# Patient Record
(demographics unavailable — no encounter records)

---

## 2024-12-09 NOTE — ASSESSMENT
[FreeTextEntry1] : 82 yo female with h/o HTN, HLD, recent R hip THR 11/4/24 complicated by PE here to establish cardiac care.  #Chest pain - possible anginal equivalent, worsening --will obtain CCTA to evaluate for CAD given intermediate CV risk --will need beta blockers and steroids pre CT (contrast allergy - rash) --will start beta blocker --continue statin --continued CV risk factor modification discussed   # Hypertension -  well controlled - continue current anti-hypertensives inc ARB - home BP monitoring encouraged - continued exercise as tolerated - Low salt diet  #Pulmonary embolism - provoked in the setting of recent hip surgery --tolerating NOAC, continue for 3-6 months --activity as tolerated  # Hyperlipidemia -  goal LDL<100 - Continue current prescribed medications inc simvastatin - Low fat low cholesterol diet - Heart healthy diet emphasized - Exercise as tolerated  Follow up after above workup

## 2024-12-09 NOTE — ASSESSMENT
[FreeTextEntry1] : 84 yo female with h/o HTN, HLD, recent R hip THR 11/4/24 complicated by PE here to establish cardiac care.  #Chest pain - possible anginal equivalent, worsening --will obtain CCTA to evaluate for CAD given intermediate CV risk --will need beta blockers and steroids pre CT (contrast allergy - rash) --will start beta blocker --continue statin --continued CV risk factor modification discussed   # Hypertension -  well controlled - continue current anti-hypertensives inc ARB - home BP monitoring encouraged - continued exercise as tolerated - Low salt diet  #Pulmonary embolism - provoked in the setting of recent hip surgery --tolerating NOAC, continue for 3-6 months --activity as tolerated  # Hyperlipidemia -  goal LDL<100 - Continue current prescribed medications inc simvastatin - Low fat low cholesterol diet - Heart healthy diet emphasized - Exercise as tolerated  Follow up after above workup

## 2024-12-09 NOTE — HISTORY OF PRESENT ILLNESS
[FreeTextEntry1] : 82 yo female with h/o HTN, HLD, recent R hip THR 11/4/24 complicated by PE here to establish cardiac care. Post op noted to be short of breath and tachycardic; underwent CTA with evidence of PE bilaterally. She underwent mechanical thrombectomy with removal of large clot from the left PA. She reports epigastric "heart burn" like discomfort, different from what she had initially that led her back to the ER within 1 week. Continues to have it. Undregoing GI evaluation with Dr. Carbajal ([facility]) She underwent a repeat CTA that revealed resolution of clot. Troponins were mildly elevated. did not undergo further cardiac workup Denies any worsening with meals or with exertion. Although her exercise capacity has been very limited. She denies prior cardiac workup or history. Her father had an MI in his 40s.   Cardiac Workup EKG sinus tach at 107 bpm; non-specific ST-T changes TTE 11/2024: normal LV size/function. LVEF 58%, mildly enlarged RV, probably normal function, calcific AV, no AS. Mild MR/TR. 11/8/24: Mechanical thrombectomy using INARI of left PA

## 2025-01-09 NOTE — HISTORY OF PRESENT ILLNESS
[Former] : former [Never] : never [TextBox_4] : 83F with hx of HTN, HLD with recent hip fracture s/p THR 11/4/2024 ([facility]) c/b high intermediate risk PE who presents for follow up.   PCP: Dr. Wade Lisa [place], [place]  [phone]  To review:  Admitted 11/3 to [facility] with R sided hip fracture, underwent repair and 24 hrs post-op she developed tachycardia and borderline O2 sats. CTPA was done which showed bilateral PE, L>R and more proximal, s/p CDT with removal of large clot from left PA. TTE showed mildly enlarged RV and probably normal function, , pBNP 648. She was on heparin gtt to start given recent surgery, then transitioned to apixaban on discharge. One week later she had heartburn, went to the ER, repeat CTA was done which showed resolution of the clot.   She saw Dr. Schilling in follow up for cards. Started on beta blocker. Doing CCTA. Still on DOAC. Also TTE was ordered, scheduled for 1/16.  Labs were done later in Nov and CBC stable from inpt.   Today,  - no SOB  - does find difficulty going up stairs because of her legs - she finished rehab for the hip but still has knees that don't work as well and aches in her back  - walks with a cane - eliquis at 10mg BID was causing severe epigastric pain, went to ER they did a full work up found nothing  - once she went down to 5mg BID she improved with her epigastric pain but still it's been making her bloated and she's had blood in her gums which she's not sure is the gums itself or it's coming from the stomach, not coughing - no leg swelling, actually lost a lot of weight bc of the pain from the surgery and the water weight from all the fluids in the hospital   Social hx - former smoking history, quit many years ago

## 2025-01-09 NOTE — ASSESSMENT
[FreeTextEntry1] :   Of note, she has small pulmonary nodules on her CT, one in each lower lobe <6mm. Given round appearance, not in upper lobes, she's low risk even with former smoking history. No need for following.  0.44% probably that the lung nodule will be diagnosed in 2-4 year follow up per julio cesar scoring.

## 2025-01-09 NOTE — RESULTS/DATA
[TextEntry] : CTA 11/24 FINDINGS: CHEST: LUNGS AND LARGE AIRWAYS: Patent central airways. 3 mm right lower lobe nodule (3:91) and 4 mm left lower lobe pulmonary nodule (3:70), unchanged. PLEURA: No pleural effusion. VESSELS: No aortic aneurysm or dissection. The extensive left-sided pulmonary embolism noted on the exam of 11/5/2024 has almost completely resolved with only residual clot remaining. Previously seen right lung clot burden has resolved. HEART: Heart size is normal. No pericardial effusion. MEDIASTINUM AND TAYLOR: 2.4 x 1.9 cm anterior mediastinal nodule is indeterminant but unchanged in size since the previous exam. Neoplasm cannot be excluded. CHEST WALL AND LOWER NECK: Thyroid nodules and thyroid calcifications. The largest nodule measures 1.5 cm. Recommend nonemergent thyroid ultrasound.

## 2025-01-15 NOTE — HISTORY OF PRESENT ILLNESS
[FreeTextEntry1] : 82 y/o female w/ PMHx HTN, HLD, recent R hip THR 11/4/24 complicated by PE here to for follow up cardiac care. She is a patient of Dr. Schilling.   Recently saw pulmonology, Eliquis was decreased to 2.5mg BID (pt was experiencing continued reflux symptoms on full dose) Plan to continue Eliquis for at least 3 months post-PE Post op noted to be short of breath and tachycardic; underwent CTA with evidence of PE bilaterally. She underwent mechanical thrombectomy with removal of large clot from the left PA. She reports epigastric "heart burn" like discomfort, different from what she had initially that led her back to the ER within 1 week. Continues to have it. Undergoing GI evaluation with Dr. Carbajal ([facility]). Has had a colonoscopy within the last 3 years She underwent a repeat CTA that revealed resolution of clot. Troponins were mildly elevated. did not undergo further cardiac workup Denies any worsening with meals or with exertion. Although her exercise capacity has been very limited. She denies prior cardiac workup or history. Her father had an MI in his 40s.  Was recommended for CCTA, does not want to go for testing at this time Patient had repeat TTE today in office  Cardiac Workup EKG sinus tach at 107 bpm; non-specific ST-T changes TTE 11/2024: normal LV size/function. LVEF 58%, mildly enlarged RV, probably normal function, calcific AV, no AS. Mild MR/TR. 11/8/24: Mechanical thrombectomy using INARI of left PA

## 2025-01-15 NOTE — ASSESSMENT
[FreeTextEntry1] : 82 y/o female w/ PMHx HTN, HLD, recent R hip THR 11/4/24 complicated by PE here to for follow up cardiac care. She is a patient of Dr. Schilling.   Assessment and plan discussed w/ attending cardiologist Dr. Schilling.   #Chest pain - possible anginal equivalent, improved with PPI and decreased Eliquis dose --recommended CCTA to evaluate for CAD given intermediate CV risk, pt is resistant at this time --will need beta blockers and steroids pre CT (contrast allergy - rash) --c/w beta blocker, tolerating --continue statin --continued CV risk factor modification discussed  #Hypertension - well controlled - continue current anti-hypertensives inc ARB - home BP monitoring encouraged - continued exercise as tolerated - Low salt diet  #Pulmonary embolism 11/2024 - provoked in the setting of recent hip surgery --tolerating NOAC, continue for 3-6 months post-PE --activity as tolerated --will check labs including d-dimer today --f/u in 1 month with Dr. Schilling  # Hyperlipidemia - goal LDL<100 - Continue current prescribed medications inc simvastatin - Low fat low cholesterol diet - Heart healthy diet emphasized - Exercise as tolerated  Follow up with Dr. Schilling in 1 month, plan for repeat labs (d-dimer)

## 2025-01-31 NOTE — BEGINNING OF VISIT
[0] : 2) Feeling down, depressed, or hopeless: Not at all (0) [PHQ-2 Negative] : PHQ-2 Negative [Pain Scale: ___] : On a scale of 1-10, today the patient's pain is a(n) [unfilled]. [Former] : Former [> 15 Years] : > 15 Years [Date Discussed (MM/DD/YY): ___] : Discussed: [unfilled] [With Patient/Caregiver] : with Patient/Caregiver [Abdominal Pain] : no abdominal pain [Vomiting] : no vomiting [Constipation] : no constipation [Diarrhea Character] : Diarrhea: Grade 0

## 2025-01-31 NOTE — PHYSICAL EXAM
[Restricted in physically strenuous activity but ambulatory and able to carry out work of a light or sedentary nature] : Status 1- Restricted in physically strenuous activity but ambulatory and able to carry out work of a light or sedentary nature, e.g., light house work, office work [Normal] : affect appropriate [de-identified] : walks with cane outside the house

## 2025-01-31 NOTE — HISTORY OF PRESENT ILLNESS
[de-identified] : Mr. Michelle Boone is 83 years old female with hx of PE on Eliquis, mediastinal mass, normocytic anemia here for consultation accompanied by her  Shant Tavares   Patient with hx of HTN, HLD who was scheduled for right hip surgery on 11/4/2025 and developed PE (s/p thrombectomy) - sent home on Eliquis --> two weeks later went into [facility] for abdominal pain --> work up neg and sent home with lower dose of 2.5 mg bid, which resolved abdominal pain.    11/5/2024 CTA - tachycardia and hypoxia  Bilateral pulmonary emboli. Moderate clot burden with involvement of upper and lower lobar arteries on the left and lower segmental/subsegmental arteries on the right.  Flattening of the interventricular septum and dilated right ventricle withelevated RV: LV ratio, suggestive of right heart strain. Consider further evaluation with echocardiogram.  Anterior mediastinal soft tissue nodule, 2.2 cm, suspicious for neoplasm.Differential diagnosis includes thymic, pedrito, or metastatic origin. Consider further evaluation with MR or soft tissue sampling as clinically warranted.  Few scattered bilateral sub-0.4 cm pulmonary nodules. Findings may besecondary to infection, inflammatory, or neoplastic in etiology. Correlate with priors if available to assess for stability. s/p thrombectomy   TTE EF 58%, RV mildly enlarged  Doppler - No evidence of deep venous thrombosis in either lower extremity.  11/9/2025 MRI Of chest  1.  Questionably enhancing 2.2 cm anterior mediastinal structure remains indeterminate. Although differential does include other entities, favor thymic lesion such as a thymoma or thymic cyst. When clinically appropriate, advise CT of the chest with and without contrast for diagnostic clarity. 2.  Mildly increased small bilateral pleural effusions compared to 11/05/2024. 3.  Partially imaged bilateral hydronephrosis. Advise correlation with renaland bladder ultrasound.  11/24/2024 CT A/P/CTA No aortic aneurysm or dissection.  The extensive left-sided pulmonary embolism noted on the exam of 11/5/2024 has almost completely resolved with only residual clot remaining. Previously seen right lung clot burden has resolved. There is no new pulmonary embolism.  1/15/2025 WBC 7.49 H/H 11.3/37.8 MCV 98.4    FHx: daughter with breast cancer - around 42 - alive daughter with breast cancer in her 50 - alive brother with lymphoma in his 60s  SurgHx: Right hip surgery only   SocialHx: Smoked in high school/college years and 5 yrs in her 60s - quit > 15 yrs ago  Social alcohol use - 1 glass per day  Denies any illicit drugs Retired, worked at [employer]  Lives with spouse Shant  Screenings: colonoscopy - around 2022 mammogram - prior Covid  DEXA - prior to Covid

## 2025-01-31 NOTE — REVIEW OF SYSTEMS
[Joint Pain] : joint pain [Recent Change In Weight] : ~T recent weight change [Negative] : Allergic/Immunologic

## 2025-01-31 NOTE — HISTORY OF PRESENT ILLNESS
[de-identified] : Mr. Michelle Boone is 83 years old female with hx of PE on Eliquis, mediastinal mass, normocytic anemia here for consultation accompanied by her  Shant Tavares   Patient with hx of HTN, HLD who was scheduled for right hip surgery on 11/4/2025 and developed PE (s/p thrombectomy) - sent home on Eliquis --> two weeks later went into [facility] for abdominal pain --> work up neg and sent home with lower dose of 2.5 mg bid, which resolved abdominal pain.    11/5/2024 CTA - tachycardia and hypoxia  Bilateral pulmonary emboli. Moderate clot burden with involvement of upper and lower lobar arteries on the left and lower segmental/subsegmental arteries on the right.  Flattening of the interventricular septum and dilated right ventricle withelevated RV: LV ratio, suggestive of right heart strain. Consider further evaluation with echocardiogram.  Anterior mediastinal soft tissue nodule, 2.2 cm, suspicious for neoplasm.Differential diagnosis includes thymic, pedrito, or metastatic origin. Consider further evaluation with MR or soft tissue sampling as clinically warranted.  Few scattered bilateral sub-0.4 cm pulmonary nodules. Findings may besecondary to infection, inflammatory, or neoplastic in etiology. Correlate with priors if available to assess for stability. s/p thrombectomy   TTE EF 58%, RV mildly enlarged  Doppler - No evidence of deep venous thrombosis in either lower extremity.  11/9/2025 MRI Of chest  1.  Questionably enhancing 2.2 cm anterior mediastinal structure remains indeterminate. Although differential does include other entities, favor thymic lesion such as a thymoma or thymic cyst. When clinically appropriate, advise CT of the chest with and without contrast for diagnostic clarity. 2.  Mildly increased small bilateral pleural effusions compared to 11/05/2024. 3.  Partially imaged bilateral hydronephrosis. Advise correlation with renaland bladder ultrasound.  11/24/2024 CT A/P/CTA No aortic aneurysm or dissection.  The extensive left-sided pulmonary embolism noted on the exam of 11/5/2024 has almost completely resolved with only residual clot remaining. Previously seen right lung clot burden has resolved. There is no new pulmonary embolism.  1/15/2025 WBC 7.49 H/H 11.3/37.8 MCV 98.4    FHx: daughter with breast cancer - around 42 - alive daughter with breast cancer in her 50 - alive brother with lymphoma in his 60s  SurgHx: Right hip surgery only   SocialHx: Smoked in high school/college years and 5 yrs in her 60s - quit > 15 yrs ago  Social alcohol use - 1 glass per day  Denies any illicit drugs Retired, worked at [employer]  Lives with spouse Shant  Screenings: colonoscopy - around 2022 mammogram - prior Covid  DEXA - prior to Covid

## 2025-01-31 NOTE — ASSESSMENT
[FreeTextEntry1] : B/L PE Provoked by hip surgery CT angio (11/2024): Bilateral pulmonary emboli. Moderate clot burden with involvement of upper and lower lobar arteries on the left and lower segmental/subsegmental arteries on the right.  Symptoms: Tachycardia post-surgery No prior personal or Family ho DVT/PE, early onset CAD, CVA No prior Family HO miscarriages  She had 1 miscarriage in first trimester Family Hx of cancer: 2 daughter with breast ca: in 40s and 50s. both daughters had negative testing Cancer screening: She has not had mammogram since COVID. Strongly advised to pursue mammogram. She lives in [facility] and wants to have her PCP order it locally Smoking: Quit 2010. Intermittently smoked in her teens Current Anticoagulation/ Antiplatelet therapy: Eliquis 2.5 mg BID as she could not tolerated 5 or 10 mg Given her first provoked PE, recommend 3 to 6 months of anticoagulation Given the moderate clot burden, reduced dose of Eliquis, I would recommend 6 months of anticoagulation Patient reports that she needs some dental work.  After 3 months of uninterrupted anticoagulation, she can pursue dental workup, however hold Eliquis 1 to 2 days prior to the procedure [if recommended by the dentist) No indication for thrombophilia testing  Anterior mediastinal mass Incidentally found on CTA [11/5/2024] No prior scans available for comparison MRI chest [11/9/2024]: 2.2 cm anterior mediastinal structure, indeterminate.  Differential include thymic lesion such as thymoma versus thymic cyst Repeat CT [11/24/2024].  Almost complete resolution of PE.  Unchanged mediastinal mass I discussed her case with thoracic surgery and subsequently with the patient Option including management versus observation discussed If she plans to pursue management, the neck step would be to obtain PET scan.  If the lesion lights up, can consider biopsy versus resection If she opts for monitoring, we can consider repeating CT in 3 months from the last scan.  And if stable can repeat the neck scan in 6 months She opts for monitoring  Normocytic anemia Discussed at length about the differential diagnosis including nutritional (iron def, B12 def, Folate Def); hemolysis; anemia of kidney disease; anemia of inflammation; multiple myeloma; drug induced; bone marrow conditions such as MDS, aplastic anemia, myelofibrosis; etc Send blood work including CBC, CMP, ferritin, etc.  Advised to be up-to-date with age-appropriate cancer screening She only agrees to do mammogram.  Schedule  Patient and her  had multiple questions which were answered to satisfaction  Follow-up in a few weeks No labs

## 2025-01-31 NOTE — REVIEW OF SYSTEMS
[Recent Change In Weight] : ~T recent weight change [Joint Pain] : joint pain [Negative] : Allergic/Immunologic

## 2025-01-31 NOTE — PHYSICAL EXAM
[Restricted in physically strenuous activity but ambulatory and able to carry out work of a light or sedentary nature] : Status 1- Restricted in physically strenuous activity but ambulatory and able to carry out work of a light or sedentary nature, e.g., light house work, office work [Normal] : affect appropriate [de-identified] : walks with cane outside the house

## 2025-01-31 NOTE — ASSESSMENT
[FreeTextEntry1] : B/L PE Provoked by hip surgery CT angio (11/2024): Bilateral pulmonary emboli. Moderate clot burden with involvement of upper and lower lobar arteries on the left and lower segmental/subsegmental arteries on the right.  Symptoms: Tachycardia post-surgery No prior personal or Family ho DVT/PE, early onset CAD, CVA No prior Family HO miscarriages  She had 1 miscarriage in first trimester Family Hx of cancer: 2 daughter with breast ca: in 40s and 50s. both daughters had negative testing Cancer screening: She has not had mammogram since COVID. Strongly advised to pursue mammogram. She lives in [facility] and wants to have her PCP order it locally Smoking: Quit 2010. Intermittently smoked in her teens Current Anticoagulation/ Antiplatelet therapy: Eliquis 2.5 mg BID as she could not tolerated 5 or 10 mg Given her first provoked PE, recommend 3 to 6 months of anticoagulation Given the moderate clot burden, reduced dose of Eliquis, I would recommend 6 months of anticoagulation Patient reports that she needs some dental work.  After 3 months of uninterrupted anticoagulation, she can pursue dental workup, however hold Eliquis 1 to 2 days prior to the procedure [if recommended by the dentist) No indication for thrombophilia testing  Anterior mediastinal mass Incidentally found on CTA [11/5/2024] No prior scans available for comparison MRI chest [11/9/2024]: 2.2 cm anterior mediastinal structure, indeterminate.  Differential include thymic lesion such as thymoma versus thymic cyst Repeat CT [11/24/2024].  Almost complete resolution of PE.  Unchanged mediastinal mass I discussed her case with thoracic surgery and subsequently with the patient Option including management versus observation discussed If she plans to pursue management, the neck step would be to obtain PET scan.  If the lesion lights up, can consider biopsy versus resection If she opts for monitoring, we can consider repeating CT in 3 months from the last scan.  And if stable can repeat the neck scan in 6 months She opts for monitoring  Normocytic anemia Discussed at length about the differential diagnosis including nutritional (iron def, B12 def, Folate Def); hemolysis; anemia of kidney disease; anemia of inflammation; multiple myeloma; drug induced; bone marrow conditions such as MDS, aplastic anemia, myelofibrosis; etc Send blood work including CBC, CMP, ferritin, etc.  Advised to be up-to-date with age-appropriate cancer screening She only agrees to do mammogram.  Schedule  Patient and her  had multiple questions which were answered to satisfaction  Follow-up in a few weeks No labs

## 2025-01-31 NOTE — PHYSICAL EXAM
[Restricted in physically strenuous activity but ambulatory and able to carry out work of a light or sedentary nature] : Status 1- Restricted in physically strenuous activity but ambulatory and able to carry out work of a light or sedentary nature, e.g., light house work, office work [Normal] : affect appropriate [de-identified] : walks with cane outside the house

## 2025-01-31 NOTE — HISTORY OF PRESENT ILLNESS
[de-identified] : Mr. Michelle Boone is 83 years old female with hx of PE on Eliquis, mediastinal mass, normocytic anemia here for consultation accompanied by her  Shant Tavares   Patient with hx of HTN, HLD who was scheduled for right hip surgery on 11/4/2025 and developed PE (s/p thrombectomy) - sent home on Eliquis --> two weeks later went into [facility] for abdominal pain --> work up neg and sent home with lower dose of 2.5 mg bid, which resolved abdominal pain.    11/5/2024 CTA - tachycardia and hypoxia  Bilateral pulmonary emboli. Moderate clot burden with involvement of upper and lower lobar arteries on the left and lower segmental/subsegmental arteries on the right.  Flattening of the interventricular septum and dilated right ventricle withelevated RV: LV ratio, suggestive of right heart strain. Consider further evaluation with echocardiogram.  Anterior mediastinal soft tissue nodule, 2.2 cm, suspicious for neoplasm.Differential diagnosis includes thymic, pedrito, or metastatic origin. Consider further evaluation with MR or soft tissue sampling as clinically warranted.  Few scattered bilateral sub-0.4 cm pulmonary nodules. Findings may besecondary to infection, inflammatory, or neoplastic in etiology. Correlate with priors if available to assess for stability. s/p thrombectomy   TTE EF 58%, RV mildly enlarged  Doppler - No evidence of deep venous thrombosis in either lower extremity.  11/9/2025 MRI Of chest  1.  Questionably enhancing 2.2 cm anterior mediastinal structure remains indeterminate. Although differential does include other entities, favor thymic lesion such as a thymoma or thymic cyst. When clinically appropriate, advise CT of the chest with and without contrast for diagnostic clarity. 2.  Mildly increased small bilateral pleural effusions compared to 11/05/2024. 3.  Partially imaged bilateral hydronephrosis. Advise correlation with renaland bladder ultrasound.  11/24/2024 CT A/P/CTA No aortic aneurysm or dissection.  The extensive left-sided pulmonary embolism noted on the exam of 11/5/2024 has almost completely resolved with only residual clot remaining. Previously seen right lung clot burden has resolved. There is no new pulmonary embolism.  1/15/2025 WBC 7.49 H/H 11.3/37.8 MCV 98.4    FHx: daughter with breast cancer - around 42 - alive daughter with breast cancer in her 50 - alive brother with lymphoma in his 60s  SurgHx: Right hip surgery only   SocialHx: Smoked in high school/college years and 5 yrs in her 60s - quit > 15 yrs ago  Social alcohol use - 1 glass per day  Denies any illicit drugs Retired, worked at [employer]  Lives with spouse Shant  Screenings: colonoscopy - around 2022 mammogram - prior Covid  DEXA - prior to Covid

## 2025-01-31 NOTE — ASSESSMENT
[FreeTextEntry1] : B/L PE Provoked by hip surgery CT angio (11/2024): Bilateral pulmonary emboli. Moderate clot burden with involvement of upper and lower lobar arteries on the left and lower segmental/subsegmental arteries on the right.  Symptoms: Tachycardia post-surgery No prior personal or Family ho DVT/PE, early onset CAD, CVA No prior Family HO miscarriages  She had 1 miscarriage in first trimester Family Hx of cancer: 2 daughter with breast ca: in 40s and 50s. both daughters had negative testing Cancer screening: She has not had mammogram since COVID. Strongly advised to pursue mammogram. She lives in [facility] and wants to have her PCP order it locally Smoking: Quit 2010. Intermittently smoked in her teens Current Anticoagulation/ Antiplatelet therapy: Eliquis 2.5 mg BID as she could not tolerated 5 or 10 mg Given her first provoked PE, recommend 3 to 6 months of anticoagulation Given the moderate clot burden, reduced dose of Eliquis, I would recommend 6 months of anticoagulation Patient reports that she needs some dental work.  After 3 months of uninterrupted anticoagulation, she can pursue dental workup, however hold Eliquis 1 to 2 days prior to the procedure [if recommended by the dentist) No indication for thrombophilia testing  Anterior mediastinal mass Incidentally found on CTA [11/5/2024] No prior scans available for comparison MRI chest [11/9/2024]: 2.2 cm anterior mediastinal structure, indeterminate.  Differential include thymic lesion such as thymoma versus thymic cyst Repeat CT [11/24/2024].  Almost complete resolution of PE.  Unchanged mediastinal mass I discussed her case with thoracic surgery and subsequently with the patient Option including management versus observation discussed If she plans to pursue management, the neck step would be to obtain PET scan.  If the lesion lights up, can consider biopsy versus resection If she opts for monitoring, we can consider repeating CT in 3 months from the last scan.  And if stable can repeat the neck scan in 6 months She opts for monitoring  Normocytic anemia Discussed at length about the differential diagnosis including nutritional (iron def, B12 def, Folate Def); hemolysis; anemia of kidney disease; anemia of inflammation; multiple myeloma; drug induced; bone marrow conditions such as MDS, aplastic anemia, myelofibrosis; etc Send blood work including CBC, CMP, ferritin, etc.  Advised to be up-to-date with age-appropriate cancer screening She only agrees to do mammogram.  Schedule  Patient and her  had multiple questions which were answered to satisfaction  Follow-up in a few weeks No labs

## 2025-02-25 NOTE — PHYSICAL EXAM
[Restricted in physically strenuous activity but ambulatory and able to carry out work of a light or sedentary nature] : Status 1- Restricted in physically strenuous activity but ambulatory and able to carry out work of a light or sedentary nature, e.g., light house work, office work [Normal] : affect appropriate [de-identified] : walks with cane outside the house

## 2025-02-25 NOTE — PHYSICAL EXAM
[Restricted in physically strenuous activity but ambulatory and able to carry out work of a light or sedentary nature] : Status 1- Restricted in physically strenuous activity but ambulatory and able to carry out work of a light or sedentary nature, e.g., light house work, office work [Normal] : affect appropriate [de-identified] : walks with cane outside the house

## 2025-02-27 NOTE — ASSESSMENT
[FreeTextEntry1] : ## B/L PE (11/2024) Provoked by hip surgery CT angio (11/2024): Bilateral pulmonary emboli. Moderate clot burden with involvement of upper and lower lobar arteries on the left and lower segmental/subsegmental arteries on the right.  Symptoms: Tachycardia post-surgery No prior personal or Family ho DVT/PE, early onset CAD, CVA No prior Family HO miscarriages  She had 1 miscarriage in first trimester Family Hx of cancer: 2 daughter with breast ca: in 40s and 50s. both daughters had negative testing Cancer screening: She has not had mammogram since COVID. Strongly advised to pursue mammogram. She lives in [facility] and wants to have her PCP order it locally Smoking: Quit 2010. Intermittently smoked in her teens Been taking Eliquis 2.5 mg BID as she could not tolerated 5 or 10 mg (11/2025 - present) No indication for thrombophilia testing clinically doing well Labs reviewed and discussed Advised to continue with Eliquis until April 2025 (6 months total)  Re-iterated on signs and symptoms of VTEs  ## Anterior mediastinal mass Incidentally found on CTA [11/5/2024] No prior scans available for comparison MRI chest [11/9/2024]: 2.2 cm anterior mediastinal structure, indeterminate.  Differential include thymic lesion such as thymoma versus thymic cyst Repeat CT [11/24/2024].  Almost complete resolution of PE.  Unchanged mediastinal mass 2/20/2025 CT Chest - unchanged thymic lesion Scan reviewed and discussed She prefers to closely monitor for now - repeat CT scan in 6 months  off note: had contrast allergies but had headaches and nausea with 2nd dose of steroids.  Normocytic anemia Work up all urnemarkable except for Ferritin 54 (1/31/2025) She's seeing her PCP tomorrow for wellness check up - will repeat Ferritin She's amenable to start on oral iron supplement at home first.    Advised to be up-to-date with age-appropriate cancer screening She only agrees to do mammogram.  Schedule  Patient and her  had multiple questions which were answered to satisfaction  d/w Dr. Aleshia thompson in 3 months  CBC, CMP, Ferritin, iron panel,

## 2025-02-27 NOTE — HISTORY OF PRESENT ILLNESS
[de-identified] : Mr. Michelle Boone is 83 years old female with hx of PE on Eliquis, mediastinal mass, normocytic anemia here for consultation accompanied by her  Shant Tavares   Patient with hx of HTN, HLD who was scheduled for right hip surgery on 11/4/2025 and developed PE (s/p thrombectomy) - sent home on Eliquis --> two weeks later went into [facility] for abdominal pain --> work up neg and sent home with lower dose of 2.5 mg bid, which resolved abdominal pain.    11/5/2024 CTA - tachycardia and hypoxia  Bilateral pulmonary emboli. Moderate clot burden with involvement of upper and lower lobar arteries on the left and lower segmental/subsegmental arteries on the right.  Flattening of the interventricular septum and dilated right ventricle withelevated RV: LV ratio, suggestive of right heart strain. Consider further evaluation with echocardiogram.  Anterior mediastinal soft tissue nodule, 2.2 cm, suspicious for neoplasm.Differential diagnosis includes thymic, pedrito, or metastatic origin. Consider further evaluation with MR or soft tissue sampling as clinically warranted.  Few scattered bilateral sub-0.4 cm pulmonary nodules. Findings may besecondary to infection, inflammatory, or neoplastic in etiology. Correlate with priors if available to assess for stability. s/p thrombectomy   TTE EF 58%, RV mildly enlarged  Doppler - No evidence of deep venous thrombosis in either lower extremity.  11/9/2025 MRI Of chest  1.  Questionably enhancing 2.2 cm anterior mediastinal structure remains indeterminate. Although differential does include other entities, favor thymic lesion such as a thymoma or thymic cyst. When clinically appropriate, advise CT of the chest with and without contrast for diagnostic clarity. 2.  Mildly increased small bilateral pleural effusions compared to 11/05/2024. 3.  Partially imaged bilateral hydronephrosis. Advise correlation with renaland bladder ultrasound.  11/24/2024 CT A/P/CTA No aortic aneurysm or dissection.  The extensive left-sided pulmonary embolism noted on the exam of 11/5/2024 has almost completely resolved with only residual clot remaining. Previously seen right lung clot burden has resolved. There is no new pulmonary embolism.  1/15/2025 WBC 7.49 H/H 11.3/37.8 MCV 98.4    FHx: daughter with breast cancer - around 42 - alive daughter with breast cancer in her 50 - alive brother with lymphoma in his 60s  SurgHx: Right hip surgery only   SocialHx: Smoked in high school/college years and 5 yrs in her 60s - quit > 15 yrs ago  Social alcohol use - 1 glass per day  Denies any illicit drugs Retired, worked at [employer]  Lives with spouse Shant  Screenings: colonoscopy - around 2022 mammogram - prior Covid  DEXA - prior to Covid  [de-identified] : Patient is here for follow up  Accompanied by her   Seen by cardiologist yesterday with normal echo   Now tolerating Eliquis .2.5 mg bid better - no longer having any abdominal pain  2/20/2025 CT scan   Unchanged thymic lesion, differential for which includes thymoma. Recommend continued follow-up.

## 2025-02-27 NOTE — ASSESSMENT
[FreeTextEntry1] : ## B/L PE (11/2024) Provoked by hip surgery CT angio (11/2024): Bilateral pulmonary emboli. Moderate clot burden with involvement of upper and lower lobar arteries on the left and lower segmental/subsegmental arteries on the right.  Symptoms: Tachycardia post-surgery No prior personal or Family ho DVT/PE, early onset CAD, CVA No prior Family HO miscarriages  She had 1 miscarriage in first trimester Family Hx of cancer: 2 daughter with breast ca: in 40s and 50s. both daughters had negative testing Cancer screening: She has not had mammogram since COVID. Strongly advised to pursue mammogram. She lives in [facility] and wants to have her PCP order it locally Smoking: Quit 2010. Intermittently smoked in her teens Been taking Eliquis 2.5 mg BID as she could not tolerated 5 or 10 mg (11/2025 - present) No indication for thrombophilia testing clinically doing well Labs reviewed and discussed Advised to continue with Eliquis until April 2025 (6 months total)  Re-iterated on signs and symptoms of VTEs  ## Anterior mediastinal mass Incidentally found on CTA [11/5/2024] No prior scans available for comparison MRI chest [11/9/2024]: 2.2 cm anterior mediastinal structure, indeterminate.  Differential include thymic lesion such as thymoma versus thymic cyst Repeat CT [11/24/2024].  Almost complete resolution of PE.  Unchanged mediastinal mass 2/20/2025 CT Chest - unchanged thymic lesion Scan reviewed and discussed She prefers to closely monitor for now - repeat CT scan in 6 months  off note: had contrast allergies but had headaches and nausea with 2nd dose of steroids.  Normocytic anemia Work up all urnemarkable except for Ferritin 54 (1/31/2025) She's seeing her PCP tomorrow for wellness check up - will repeat Ferritin She's amenable to start on oral iron supplement at home first.    Advised to be up-to-date with age-appropriate cancer screening She only agrees to do mammogram.  Schedule  Patient and her  had multiple questions which were answered to satisfaction  d/w Dr. Aleshia thompson in 3 months  CBC, CMP, Ferritin, iron panel,

## 2025-02-27 NOTE — HISTORY OF PRESENT ILLNESS
[de-identified] : Mr. Michelle Boone is 83 years old female with hx of PE on Eliquis, mediastinal mass, normocytic anemia here for consultation accompanied by her  Shant Tavares   Patient with hx of HTN, HLD who was scheduled for right hip surgery on 11/4/2025 and developed PE (s/p thrombectomy) - sent home on Eliquis --> two weeks later went into [facility] for abdominal pain --> work up neg and sent home with lower dose of 2.5 mg bid, which resolved abdominal pain.    11/5/2024 CTA - tachycardia and hypoxia  Bilateral pulmonary emboli. Moderate clot burden with involvement of upper and lower lobar arteries on the left and lower segmental/subsegmental arteries on the right.  Flattening of the interventricular septum and dilated right ventricle withelevated RV: LV ratio, suggestive of right heart strain. Consider further evaluation with echocardiogram.  Anterior mediastinal soft tissue nodule, 2.2 cm, suspicious for neoplasm.Differential diagnosis includes thymic, pedrito, or metastatic origin. Consider further evaluation with MR or soft tissue sampling as clinically warranted.  Few scattered bilateral sub-0.4 cm pulmonary nodules. Findings may besecondary to infection, inflammatory, or neoplastic in etiology. Correlate with priors if available to assess for stability. s/p thrombectomy   TTE EF 58%, RV mildly enlarged  Doppler - No evidence of deep venous thrombosis in either lower extremity.  11/9/2025 MRI Of chest  1.  Questionably enhancing 2.2 cm anterior mediastinal structure remains indeterminate. Although differential does include other entities, favor thymic lesion such as a thymoma or thymic cyst. When clinically appropriate, advise CT of the chest with and without contrast for diagnostic clarity. 2.  Mildly increased small bilateral pleural effusions compared to 11/05/2024. 3.  Partially imaged bilateral hydronephrosis. Advise correlation with renaland bladder ultrasound.  11/24/2024 CT A/P/CTA No aortic aneurysm or dissection.  The extensive left-sided pulmonary embolism noted on the exam of 11/5/2024 has almost completely resolved with only residual clot remaining. Previously seen right lung clot burden has resolved. There is no new pulmonary embolism.  1/15/2025 WBC 7.49 H/H 11.3/37.8 MCV 98.4    FHx: daughter with breast cancer - around 42 - alive daughter with breast cancer in her 50 - alive brother with lymphoma in his 60s  SurgHx: Right hip surgery only   SocialHx: Smoked in high school/college years and 5 yrs in her 60s - quit > 15 yrs ago  Social alcohol use - 1 glass per day  Denies any illicit drugs Retired, worked at [employer]  Lives with spouse Shant  Screenings: colonoscopy - around 2022 mammogram - prior Covid  DEXA - prior to Covid  [de-identified] : Patient is here for follow up  Accompanied by her   Seen by cardiologist yesterday with normal echo   Now tolerating Eliquis .2.5 mg bid better - no longer having any abdominal pain  2/20/2025 CT scan   Unchanged thymic lesion, differential for which includes thymoma. Recommend continued follow-up.

## 2025-02-27 NOTE — REVIEW OF SYSTEMS
[Recent Change In Weight] : ~T recent weight change [Joint Pain] : joint pain [Negative] : Allergic/Immunologic [FreeTextEntry2] : 10 point review of systems negative except as outlined in HPI

## 2025-03-08 NOTE — HISTORY OF PRESENT ILLNESS
[FreeTextEntry1] : 83 y/o female w/ PMHx HTN, HLD, recent R hip THR 11/4/24 complicated by PE here to for follow up cardiac care.  Today she denies cp, sob, palps, lightheadedness, dizziness Eliquis was decreased to 2.5mg BID by pulmonary (pt was experiencing continued reflux symptoms on full dose, is tolerating now). Plan to continue Eliquis for ideally 6-months post-PE as per heme/onc She reports epigastric "heart burn" like discomfort, different from what she had initially that led her back to the ER within 1 week. Continues to have it. Undergoing GI evaluation with Dr. Carbajal ([facility]). Has had a colonoscopy within the last 3 years She underwent a repeat CTA that revealed resolution of clot. Troponins were mildly elevated. did not undergo further cardiac workup. Denies any worsening with meals or with exertion. Although her exercise capacity has been very limited. Her father had an MI in his 40s.  Was recommended for CCTA, does not want to go for testing at this time. Did not feel comfortable about taking a beta blocker prior to the test.  Cardiac Workup EKG sinus tach at 107 bpm; non-specific ST-T changes TTE 11/2024: normal LV size/function. LVEF 58%, mildly enlarged RV, probably normal function, calcific AV, no AS. Mild MR/TR. 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

## 2025-03-08 NOTE — ASSESSMENT
[FreeTextEntry1] : 85 y/o female w/ PMHx HTN, HLD, recent R hip THR 11/4/24 complicated by PE here to for follow up cardiac care.   #Chest pain - improved with PPI and decreased Eliquis dose; denies any symptoms at this time --given coronary calcifications on CT, rec for CCTA, however she defers at this time --to consider pharm nuclear stress if she is amenable. Denies symptoms and wants to defer at this time --c/w beta blocker, tolerating --continue statin --continued CV risk factor modification discussed  #Hypertension - well controlled - continue current anti-hypertensives inc ARB - home BP monitoring encouraged - continued exercise as tolerated - Low salt diet  #Pulmonary embolism 11/2024 - provoked in the setting of recent hip surgery --tolerating NOAC, continue for 6 months post-PE --activity as tolerated --continue pulm and heme/onc follow up  # Hyperlipidemia - goal LDL<100 - Continue current prescribed medications inc simvastatin - Low fat low cholesterol diet - Heart healthy diet emphasized - Exercise as tolerated - repeat lipids in 2 months recommended  Follow up in 3-6 months

## 2025-03-08 NOTE — HISTORY OF PRESENT ILLNESS
[FreeTextEntry1] : 85 y/o female w/ PMHx HTN, HLD, recent R hip THR 11/4/24 complicated by PE here to for follow up cardiac care.  Today she denies cp, sob, palps, lightheadedness, dizziness Eliquis was decreased to 2.5mg BID by pulmonary (pt was experiencing continued reflux symptoms on full dose, is tolerating now). Plan to continue Eliquis for ideally 6-months post-PE as per heme/onc She reports epigastric "heart burn" like discomfort, different from what she had initially that led her back to the ER within 1 week. Continues to have it. Undergoing GI evaluation with Dr. Carbajal ([facility]). Has had a colonoscopy within the last 3 years She underwent a repeat CTA that revealed resolution of clot. Troponins were mildly elevated. did not undergo further cardiac workup. Denies any worsening with meals or with exertion. Although her exercise capacity has been very limited. Her father had an MI in his 40s.  Was recommended for CCTA, does not want to go for testing at this time. Did not feel comfortable about taking a beta blocker prior to the test.  Cardiac Workup EKG sinus tach at 107 bpm; non-specific ST-T changes TTE 11/2024: normal LV size/function. LVEF 58%, mildly enlarged RV, probably normal function, calcific AV, no AS. Mild MR/TR. 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

## 2025-03-08 NOTE — ASSESSMENT
[FreeTextEntry1] : 83 y/o female w/ PMHx HTN, HLD, recent R hip THR 11/4/24 complicated by PE here to for follow up cardiac care.   #Chest pain - improved with PPI and decreased Eliquis dose; denies any symptoms at this time --given coronary calcifications on CT, rec for CCTA, however she defers at this time --to consider pharm nuclear stress if she is amenable. Denies symptoms and wants to defer at this time --c/w beta blocker, tolerating --continue statin --continued CV risk factor modification discussed  #Hypertension - well controlled - continue current anti-hypertensives inc ARB - home BP monitoring encouraged - continued exercise as tolerated - Low salt diet  #Pulmonary embolism 11/2024 - provoked in the setting of recent hip surgery --tolerating NOAC, continue for 6 months post-PE --activity as tolerated --continue pulm and heme/onc follow up  # Hyperlipidemia - goal LDL<100 - Continue current prescribed medications inc simvastatin - Low fat low cholesterol diet - Heart healthy diet emphasized - Exercise as tolerated - repeat lipids in 2 months recommended  Follow up in 3-6 months

## 2025-04-25 NOTE — RESULTS/DATA
[TextEntry] : CTA 11/24 FINDINGS: CHEST: LUNGS AND LARGE AIRWAYS: Patent central airways. 3 mm right lower lobe nodule (3:91) and 4 mm left lower lobe pulmonary nodule (3:70), unchanged. PLEURA: No pleural effusion. VESSELS: No aortic aneurysm or dissection. The extensive left-sided pulmonary embolism noted on the exam of 11/5/2024 has almost completely resolved with only residual clot remaining. Previously seen right lung clot burden has resolved. HEART: Heart size is normal. No pericardial effusion. MEDIASTINUM AND TAYLOR: 2.4 x 1.9 cm anterior mediastinal nodule is indeterminant but unchanged in size since the previous exam. Neoplasm cannot be excluded. CHEST WALL AND LOWER NECK: Thyroid nodules and thyroid calcifications. The largest nodule measures 1.5 cm. Recommend nonemergent thyroid ultrasound

## 2025-04-25 NOTE — HISTORY OF PRESENT ILLNESS
[TextBox_4] : 83F with hx of HTN, HLD with recent hip fracture s/p THR 11/4/2024 ([facility]) c/b high intermediate risk PE who presents for follow up.  PCP: Dr. Wade Lisa Baraga,  [phone]  To review: Admitted 11/3 to [facility] with R sided hip fracture, underwent repair and 24 hrs post-op she developed tachycardia and borderline O2 sats. CTPA was done which showed bilateral PE, L>R and more proximal, s/p CDT with removal of large clot from left PA (Dr. Schilling). TTE showed mildly enlarged RV and probably normal function, , pBNP 648. She was on heparin gtt to start given recent surgery, then transitioned to apixaban on discharge. One week later she had heartburn, went to the ER, repeat CTA was done which showed resolution of the clot. Folllow up Jan 2025. No SOB, finished rehab for the hip but has b/l knee pain. Eliquis 5mg BID but started doing it just once a day because it's been giving her epigastric pain and gums have been bleeding. Plan for 3 months of AC given provoked iso surgery. Discussed ideally she would do eliquis 5mg BID but doing the eliquis 2.5mg BID for the last month is better than 5mg once daily. Plan to finish Feb 4th.   Interval events  - TTE didn't print this part but RV normal size and PASP <20, spoke with Dr. Schilling who read the report  - saw heme who recc if she's doing reduced dose to extend to 6 months ; plan for serial scans for mediastinal mass  - saw cards again as well, she didn't want to do CCTA so pharm nuc was ordered; continues to have epigastric pain, getting work up with GI [facility]  - CT chest IV contrast feb 2025 - stable possible thymoma , pulm nodules stable   Today, - no sob, no chest pain, no bleeding  - tolerating the 2.5mg BID much better , no epigastric discomfort  - will finish May 4th   Social hx - former smoking history, quit many years ago.

## 2025-04-25 NOTE — HISTORY OF PRESENT ILLNESS
[TextBox_4] : 83F with hx of HTN, HLD with recent hip fracture s/p THR 11/4/2024 ([facility]) c/b high intermediate risk PE who presents for follow up.  PCP: Dr. Wade Lisa Hopewell,  [phone]  To review: Admitted 11/3 to [facility] with R sided hip fracture, underwent repair and 24 hrs post-op she developed tachycardia and borderline O2 sats. CTPA was done which showed bilateral PE, L>R and more proximal, s/p CDT with removal of large clot from left PA (Dr. Schilling). TTE showed mildly enlarged RV and probably normal function, , pBNP 648. She was on heparin gtt to start given recent surgery, then transitioned to apixaban on discharge. One week later she had heartburn, went to the ER, repeat CTA was done which showed resolution of the clot. Folllow up Jan 2025. No SOB, finished rehab for the hip but has b/l knee pain. Eliquis 5mg BID but started doing it just once a day because it's been giving her epigastric pain and gums have been bleeding. Plan for 3 months of AC given provoked iso surgery. Discussed ideally she would do eliquis 5mg BID but doing the eliquis 2.5mg BID for the last month is better than 5mg once daily. Plan to finish Feb 4th.   Interval events  - TTE didn't print this part but RV normal size and PASP <20, spoke with Dr. Schilling who read the report  - saw heme who recc if she's doing reduced dose to extend to 6 months ; plan for serial scans for mediastinal mass  - saw cards again as well, she didn't want to do CCTA so pharm nuc was ordered; continues to have epigastric pain, getting work up with GI [facility]  - CT chest IV contrast feb 2025 - stable possible thymoma , pulm nodules stable   Today, - no sob, no chest pain, no bleeding  - tolerating the 2.5mg BID much better , no epigastric discomfort  - will finish May 4th   Social hx - former smoking history, quit many years ago.

## 2025-04-30 NOTE — ASSESSMENT
[FreeTextEntry1] :  ## B/L PE (11/2024) Provoked by hip surgery CT angio (11/2024): Bilateral pulmonary emboli. Moderate clot burden with involvement of upper and lower lobar arteries on the left and lower segmental/subsegmental arteries on the right.  Symptoms: Tachycardia post-surgery No prior personal or Family ho DVT/PE, early onset CAD, CVA No prior Family HO miscarriages  She had 1 miscarriage in first trimester Family Hx of cancer: 2 daughter with breast ca: in 40s and 50s. both daughters had negative testing Cancer screening: She has not had mammogram since COVID. Strongly advised to pursue mammogram. She lives in [facility] and wants to have her PCP order it locally Smoking: Quit 2010. Intermittently smoked in her teens Been taking Eliquis 2.5 mg BID as she could not tolerated 5 or 10 mg (11/2025 - present) No indication for thrombophilia testing  patient is here for follow up and clinically doing well -Labs are drawn in the office, reviewed, analyzed, and discussed Normal D dimer  Advised on finishing up this month supply of Eliquis and then stop (4/30/2024) Signs and symptoms of VTE re-iterated  ## Anterior mediastinal mass Incidentally found on CTA [11/5/2024] No prior scans available for comparison MRI chest [11/9/2024]: 2.2 cm anterior mediastinal structure, indeterminate.  Differential include thymic lesion such as thymoma versus thymic cyst Repeat CT [11/24/2024].  Almost complete resolution of PE.  Unchanged mediastinal mass 2/20/2025 CT Chest - unchanged thymic lesion Scan reviewed and discussed She prefers to closely monitor for now - repeat CT scan in 6 months (August 2025) with Benadryl and steroids as premeds off note - rash with contrast - will need steroids and benadryl   ## Normocytic anemia s/p Colonoscopy - 2022 was on oral supplement once or twice a week until a few weeks ago  Hgb 11.7  Ferritin 54--> 38  Given a drop in Ferritin - advised to get Injectafer/Feraheme x 1 or Venofer 400 mg x 2.  Patient and her  had multiple questions which were answered to satisfaction  d/w Dr. Monk  rtc in 6 months  CBC, CMP, Ferritin, iron panel,

## 2025-04-30 NOTE — HISTORY OF PRESENT ILLNESS
[de-identified] : Mr. Michelle Boone is 83 years old female with hx of PE on Eliquis, mediastinal mass, normocytic anemia here for consultation accompanied by her  Shant Tavares   Patient with hx of HTN, HLD who was scheduled for right hip surgery on 11/4/2025 and developed PE (s/p thrombectomy) - sent home on Eliquis --> two weeks later went into [facility] for abdominal pain --> work up neg and sent home with lower dose of 2.5 mg bid, which resolved abdominal pain.    11/5/2024 CTA - tachycardia and hypoxia  Bilateral pulmonary emboli. Moderate clot burden with involvement of upper and lower lobar arteries on the left and lower segmental/subsegmental arteries on the right.  Flattening of the interventricular septum and dilated right ventricle withelevated RV: LV ratio, suggestive of right heart strain. Consider further evaluation with echocardiogram.  Anterior mediastinal soft tissue nodule, 2.2 cm, suspicious for neoplasm.Differential diagnosis includes thymic, pedrito, or metastatic origin. Consider further evaluation with MR or soft tissue sampling as clinically warranted.  Few scattered bilateral sub-0.4 cm pulmonary nodules. Findings may besecondary to infection, inflammatory, or neoplastic in etiology. Correlate with priors if available to assess for stability. s/p thrombectomy   TTE EF 58%, RV mildly enlarged  Doppler - No evidence of deep venous thrombosis in either lower extremity.  11/9/2025 MRI Of chest  1.  Questionably enhancing 2.2 cm anterior mediastinal structure remains indeterminate. Although differential does include other entities, favor thymic lesion such as a thymoma or thymic cyst. When clinically appropriate, advise CT of the chest with and without contrast for diagnostic clarity. 2.  Mildly increased small bilateral pleural effusions compared to 11/05/2024. 3.  Partially imaged bilateral hydronephrosis. Advise correlation with renaland bladder ultrasound.  11/24/2024 CT A/P/CTA No aortic aneurysm or dissection.  The extensive left-sided pulmonary embolism noted on the exam of 11/5/2024 has almost completely resolved with only residual clot remaining. Previously seen right lung clot burden has resolved. There is no new pulmonary embolism.  1/15/2025 WBC 7.49 H/H 11.3/37.8 MCV 98.4    FHx: daughter with breast cancer - around 42 - alive daughter with breast cancer in her 50 - alive brother with lymphoma in his 60s  SurgHx: Right hip surgery only   SocialHx: Smoked in high school/college years and 5 yrs in her 60s - quit > 15 yrs ago  Social alcohol use - 1 glass per day  Denies any illicit drugs Retired, worked at [employer]  Lives with spouse Shant  Screenings: colonoscopy - around 2022 mammogram - prior Covid  DEXA - prior to Covid  [de-identified] : Patient is here for follow up for b/l PE  She has been having intermittent lower leg swelling Has Eliquis until the end of this month.

## 2025-04-30 NOTE — HISTORY OF PRESENT ILLNESS
[de-identified] : Mr. Michelle Boone is 83 years old female with hx of PE on Eliquis, mediastinal mass, normocytic anemia here for consultation accompanied by her  Shant Tavares   Patient with hx of HTN, HLD who was scheduled for right hip surgery on 11/4/2025 and developed PE (s/p thrombectomy) - sent home on Eliquis --> two weeks later went into [facility] for abdominal pain --> work up neg and sent home with lower dose of 2.5 mg bid, which resolved abdominal pain.    11/5/2024 CTA - tachycardia and hypoxia  Bilateral pulmonary emboli. Moderate clot burden with involvement of upper and lower lobar arteries on the left and lower segmental/subsegmental arteries on the right.  Flattening of the interventricular septum and dilated right ventricle withelevated RV: LV ratio, suggestive of right heart strain. Consider further evaluation with echocardiogram.  Anterior mediastinal soft tissue nodule, 2.2 cm, suspicious for neoplasm.Differential diagnosis includes thymic, pedrito, or metastatic origin. Consider further evaluation with MR or soft tissue sampling as clinically warranted.  Few scattered bilateral sub-0.4 cm pulmonary nodules. Findings may besecondary to infection, inflammatory, or neoplastic in etiology. Correlate with priors if available to assess for stability. s/p thrombectomy   TTE EF 58%, RV mildly enlarged  Doppler - No evidence of deep venous thrombosis in either lower extremity.  11/9/2025 MRI Of chest  1.  Questionably enhancing 2.2 cm anterior mediastinal structure remains indeterminate. Although differential does include other entities, favor thymic lesion such as a thymoma or thymic cyst. When clinically appropriate, advise CT of the chest with and without contrast for diagnostic clarity. 2.  Mildly increased small bilateral pleural effusions compared to 11/05/2024. 3.  Partially imaged bilateral hydronephrosis. Advise correlation with renaland bladder ultrasound.  11/24/2024 CT A/P/CTA No aortic aneurysm or dissection.  The extensive left-sided pulmonary embolism noted on the exam of 11/5/2024 has almost completely resolved with only residual clot remaining. Previously seen right lung clot burden has resolved. There is no new pulmonary embolism.  1/15/2025 WBC 7.49 H/H 11.3/37.8 MCV 98.4    FHx: daughter with breast cancer - around 42 - alive daughter with breast cancer in her 50 - alive brother with lymphoma in his 60s  SurgHx: Right hip surgery only   SocialHx: Smoked in high school/college years and 5 yrs in her 60s - quit > 15 yrs ago  Social alcohol use - 1 glass per day  Denies any illicit drugs Retired, worked at [employer]  Lives with spouse Shant  Screenings: colonoscopy - around 2022 mammogram - prior Covid  DEXA - prior to Covid  [de-identified] : Patient is here for follow up for b/l PE  She has been having intermittent lower leg swelling Has Eliquis until the end of this month.

## 2025-04-30 NOTE — ASSESSMENT
[FreeTextEntry1] :  ## B/L PE (11/2024) Provoked by hip surgery CT angio (11/2024): Bilateral pulmonary emboli. Moderate clot burden with involvement of upper and lower lobar arteries on the left and lower segmental/subsegmental arteries on the right.  Symptoms: Tachycardia post-surgery No prior personal or Family ho DVT/PE, early onset CAD, CVA No prior Family HO miscarriages  She had 1 miscarriage in first trimester Family Hx of cancer: 2 daughter with breast ca: in 40s and 50s. both daughters had negative testing Cancer screening: She has not had mammogram since COVID. Strongly advised to pursue mammogram. She lives in [facility] and wants to have her PCP order it locally Smoking: Quit 2010. Intermittently smoked in her teens Been taking Eliquis 2.5 mg BID as she could not tolerated 5 or 10 mg (11/2025 - present) No indication for thrombophilia testing  patient is here for follow up and clinically doing well -Labs are drawn in the office, reviewed, analyzed, and discussed Normal D dimer  Advised on finishing up this month supply of Eliquis and then stop (4/30/2024) Signs and symptoms of VTE re-iterated  ## Anterior mediastinal mass Incidentally found on CTA [11/5/2024] No prior scans available for comparison MRI chest [11/9/2024]: 2.2 cm anterior mediastinal structure, indeterminate.  Differential include thymic lesion such as thymoma versus thymic cyst Repeat CT [11/24/2024].  Almost complete resolution of PE.  Unchanged mediastinal mass 2/20/2025 CT Chest - unchanged thymic lesion Scan reviewed and discussed She prefers to closely monitor for now - repeat CT scan in 6 months (August 2025) with Benadryl and steroids as premeds off note - rash with contrast - will need steroids and benadryl   ## Normocytic anemia s/p Colonoscopy - 2022 was on oral supplement once or twice a week until a few weeks ago  Hgb 11.7  Ferritin 54--> 38  Given a drop in Ferritin - advised to get Injectafer/Feraheme x 1 or Venofer 400 mg x 2.  Patient and her  had multiple questions which were answered to satisfaction  d/w Dr. Monk  rtc in 6 months  CBC, CMP, Ferritin, iron panel,

## 2025-04-30 NOTE — ASSESSMENT
[FreeTextEntry1] :  ## B/L PE (11/2024) Provoked by hip surgery CT angio (11/2024): Bilateral pulmonary emboli. Moderate clot burden with involvement of upper and lower lobar arteries on the left and lower segmental/subsegmental arteries on the right.  Symptoms: Tachycardia post-surgery No prior personal or Family ho DVT/PE, early onset CAD, CVA No prior Family HO miscarriages  She had 1 miscarriage in first trimester Family Hx of cancer: 2 daughter with breast ca: in 40s and 50s. both daughters had negative testing Cancer screening: She has not had mammogram since COVID. Strongly advised to pursue mammogram. She lives in [facility] and wants to have her PCP order it locally Smoking: Quit 2010. Intermittently smoked in her teens Been taking Eliquis 2.5 mg BID as she could not tolerated 5 or 10 mg (11/2025 - present) No indication for thrombophilia testing  patient is here for follow up and clinically doing well -Labs are drawn in the office, reviewed, analyzed, and discussed Normal D dimer  Advised on finishing up this month supply of Eliquis and then stop (4/30/2024) Signs and symptoms of VTE re-iterated  ## Anterior mediastinal mass Incidentally found on CTA [11/5/2024] No prior scans available for comparison MRI chest [11/9/2024]: 2.2 cm anterior mediastinal structure, indeterminate.  Differential include thymic lesion such as thymoma versus thymic cyst Repeat CT [11/24/2024].  Almost complete resolution of PE.  Unchanged mediastinal mass 2/20/2025 CT Chest - unchanged thymic lesion Scan reviewed and discussed She prefers to closely monitor for now - repeat CT scan in 6 months (August 2025) with Benadryl and steroids as premeds off note - rash with contrast - will need steroids and benadryl   ## Normocytic anemia s/p Colonoscopy - 2022 was on oral supplement once or twice a week until a few weeks ago  Hgb 11.7  Ferritin 54--> 38  Given a drop in Ferritin - advised to get Injectafer/Feraheme x 1 or Venofer 400 mg x 2.  Patient and her  had multiple questions which were answered to satisfaction  d/w Dr. Monk  rtc in 6 months  CBC, CMP, Ferritin, iron panel,

## 2025-04-30 NOTE — HISTORY OF PRESENT ILLNESS
[de-identified] : Mr. Michelle Boone is 83 years old female with hx of PE on Eliquis, mediastinal mass, normocytic anemia here for consultation accompanied by her  Shant Tavares   Patient with hx of HTN, HLD who was scheduled for right hip surgery on 11/4/2025 and developed PE (s/p thrombectomy) - sent home on Eliquis --> two weeks later went into [facility] for abdominal pain --> work up neg and sent home with lower dose of 2.5 mg bid, which resolved abdominal pain.    11/5/2024 CTA - tachycardia and hypoxia  Bilateral pulmonary emboli. Moderate clot burden with involvement of upper and lower lobar arteries on the left and lower segmental/subsegmental arteries on the right.  Flattening of the interventricular septum and dilated right ventricle withelevated RV: LV ratio, suggestive of right heart strain. Consider further evaluation with echocardiogram.  Anterior mediastinal soft tissue nodule, 2.2 cm, suspicious for neoplasm.Differential diagnosis includes thymic, pedrito, or metastatic origin. Consider further evaluation with MR or soft tissue sampling as clinically warranted.  Few scattered bilateral sub-0.4 cm pulmonary nodules. Findings may besecondary to infection, inflammatory, or neoplastic in etiology. Correlate with priors if available to assess for stability. s/p thrombectomy   TTE EF 58%, RV mildly enlarged  Doppler - No evidence of deep venous thrombosis in either lower extremity.  11/9/2025 MRI Of chest  1.  Questionably enhancing 2.2 cm anterior mediastinal structure remains indeterminate. Although differential does include other entities, favor thymic lesion such as a thymoma or thymic cyst. When clinically appropriate, advise CT of the chest with and without contrast for diagnostic clarity. 2.  Mildly increased small bilateral pleural effusions compared to 11/05/2024. 3.  Partially imaged bilateral hydronephrosis. Advise correlation with renaland bladder ultrasound.  11/24/2024 CT A/P/CTA No aortic aneurysm or dissection.  The extensive left-sided pulmonary embolism noted on the exam of 11/5/2024 has almost completely resolved with only residual clot remaining. Previously seen right lung clot burden has resolved. There is no new pulmonary embolism.  1/15/2025 WBC 7.49 H/H 11.3/37.8 MCV 98.4    FHx: daughter with breast cancer - around 42 - alive daughter with breast cancer in her 50 - alive brother with lymphoma in his 60s  SurgHx: Right hip surgery only   SocialHx: Smoked in high school/college years and 5 yrs in her 60s - quit > 15 yrs ago  Social alcohol use - 1 glass per day  Denies any illicit drugs Retired, worked at [employer]  Lives with spouse Shant  Screenings: colonoscopy - around 2022 mammogram - prior Covid  DEXA - prior to Covid  [de-identified] : Patient is here for follow up for b/l PE  She has been having intermittent lower leg swelling Has Eliquis until the end of this month.

## 2025-04-30 NOTE — RESULTS/DATA
[FreeTextEntry1] : Extensive prior records reviewed analyzed and discussed Carac Counseling:  I discussed with the patient the risks of Carac including but not limited to erythema, scaling, itching, weeping, crusting, and pain.